# Patient Record
Sex: MALE | Race: WHITE | NOT HISPANIC OR LATINO | Employment: FULL TIME | URBAN - METROPOLITAN AREA
[De-identification: names, ages, dates, MRNs, and addresses within clinical notes are randomized per-mention and may not be internally consistent; named-entity substitution may affect disease eponyms.]

---

## 2024-01-23 ENCOUNTER — APPOINTMENT (OUTPATIENT)
Dept: CT IMAGING | Facility: CLINIC | Age: 44
End: 2024-01-23
Attending: EMERGENCY MEDICINE
Payer: COMMERCIAL

## 2024-01-23 ENCOUNTER — HOSPITAL ENCOUNTER (EMERGENCY)
Facility: CLINIC | Age: 44
Discharge: HOME | End: 2024-01-23
Admitting: EMERGENCY MEDICINE
Payer: COMMERCIAL

## 2024-01-23 VITALS
OXYGEN SATURATION: 90 % | WEIGHT: 225 LBS | RESPIRATION RATE: 25 BRPM | HEIGHT: 70 IN | HEART RATE: 86 BPM | BODY MASS INDEX: 32.21 KG/M2 | DIASTOLIC BLOOD PRESSURE: 70 MMHG | TEMPERATURE: 97.7 F | SYSTOLIC BLOOD PRESSURE: 128 MMHG

## 2024-01-23 DIAGNOSIS — F12.929: ICD-10-CM

## 2024-01-23 LAB
ALBUMIN SERPL BCG-MCNC: 4.4 G/DL (ref 3.5–5.2)
ALP SERPL-CCNC: 74 U/L (ref 40–150)
ALT SERPL W P-5'-P-CCNC: ABNORMAL U/L
AMPHETAMINES UR QL SCN: NORMAL
ANION GAP SERPL CALCULATED.3IONS-SCNC: 7 MMOL/L (ref 7–15)
AST SERPL W P-5'-P-CCNC: ABNORMAL U/L
ATRIAL RATE - MUSE: 94 BPM
BARBITURATES UR QL SCN: NORMAL
BASOPHILS # BLD AUTO: 0 10E3/UL (ref 0–0.2)
BASOPHILS NFR BLD AUTO: 0 %
BENZODIAZ UR QL SCN: NORMAL
BILIRUB SERPL-MCNC: 0.3 MG/DL
BUN SERPL-MCNC: 13.9 MG/DL (ref 6–20)
BZE UR QL SCN: NORMAL
CALCIUM SERPL-MCNC: 9.4 MG/DL (ref 8.6–10)
CANNABINOIDS UR QL SCN: NORMAL
CHLORIDE SERPL-SCNC: 102 MMOL/L (ref 98–107)
CREAT SERPL-MCNC: 1 MG/DL (ref 0.67–1.17)
DEPRECATED HCO3 PLAS-SCNC: 28 MMOL/L (ref 22–29)
DIASTOLIC BLOOD PRESSURE - MUSE: 73 MMHG
EGFRCR SERPLBLD CKD-EPI 2021: >90 ML/MIN/1.73M2
EOSINOPHIL # BLD AUTO: 0 10E3/UL (ref 0–0.7)
EOSINOPHIL NFR BLD AUTO: 0 %
ERYTHROCYTE [DISTWIDTH] IN BLOOD BY AUTOMATED COUNT: 12.5 % (ref 10–15)
ETHANOL SERPL-MCNC: <0.01 G/DL
FENTANYL UR QL: NORMAL
GLUCOSE SERPL-MCNC: 135 MG/DL (ref 70–99)
HCT VFR BLD AUTO: 45.8 % (ref 40–53)
HGB BLD-MCNC: 15.6 G/DL (ref 13.3–17.7)
IMM GRANULOCYTES # BLD: 0 10E3/UL
IMM GRANULOCYTES NFR BLD: 0 %
INTERPRETATION ECG - MUSE: NORMAL
LYMPHOCYTES # BLD AUTO: 0.7 10E3/UL (ref 0.8–5.3)
LYMPHOCYTES NFR BLD AUTO: 9 %
MAGNESIUM SERPL-MCNC: 2.2 MG/DL (ref 1.7–2.3)
MCH RBC QN AUTO: 30.6 PG (ref 26.5–33)
MCHC RBC AUTO-ENTMCNC: 34.1 G/DL (ref 31.5–36.5)
MCV RBC AUTO: 90 FL (ref 78–100)
MONOCYTES # BLD AUTO: 0.4 10E3/UL (ref 0–1.3)
MONOCYTES NFR BLD AUTO: 6 %
NEUTROPHILS # BLD AUTO: 6.5 10E3/UL (ref 1.6–8.3)
NEUTROPHILS NFR BLD AUTO: 85 %
NRBC # BLD AUTO: 0 10E3/UL
NRBC BLD AUTO-RTO: 0 /100
OPIATES UR QL SCN: NORMAL
P AXIS - MUSE: 49 DEGREES
PCP QUAL URINE (ROCHE): NORMAL
PLATELET # BLD AUTO: 266 10E3/UL (ref 150–450)
POTASSIUM SERPL-SCNC: 5.7 MMOL/L (ref 3.4–5.3)
PR INTERVAL - MUSE: 174 MS
PROT SERPL-MCNC: 7.4 G/DL (ref 6.4–8.3)
QRS DURATION - MUSE: 88 MS
QT - MUSE: 372 MS
QTC - MUSE: 465 MS
R AXIS - MUSE: -20 DEGREES
RBC # BLD AUTO: 5.09 10E6/UL (ref 4.4–5.9)
SODIUM SERPL-SCNC: 137 MMOL/L (ref 135–145)
SYSTOLIC BLOOD PRESSURE - MUSE: 116 MMHG
T AXIS - MUSE: 44 DEGREES
VENTRICULAR RATE- MUSE: 94 BPM
WBC # BLD AUTO: 7.7 10E3/UL (ref 4–11)

## 2024-01-23 PROCEDURE — 85025 COMPLETE CBC W/AUTO DIFF WBC: CPT | Performed by: EMERGENCY MEDICINE

## 2024-01-23 PROCEDURE — 82077 ASSAY SPEC XCP UR&BREATH IA: CPT | Performed by: EMERGENCY MEDICINE

## 2024-01-23 PROCEDURE — 258N000003 HC RX IP 258 OP 636: Performed by: EMERGENCY MEDICINE

## 2024-01-23 PROCEDURE — 93005 ELECTROCARDIOGRAM TRACING: CPT | Performed by: EMERGENCY MEDICINE

## 2024-01-23 PROCEDURE — 36415 COLL VENOUS BLD VENIPUNCTURE: CPT | Performed by: EMERGENCY MEDICINE

## 2024-01-23 PROCEDURE — 83735 ASSAY OF MAGNESIUM: CPT | Performed by: EMERGENCY MEDICINE

## 2024-01-23 PROCEDURE — 84075 ASSAY ALKALINE PHOSPHATASE: CPT | Performed by: EMERGENCY MEDICINE

## 2024-01-23 PROCEDURE — 96360 HYDRATION IV INFUSION INIT: CPT

## 2024-01-23 PROCEDURE — 70450 CT HEAD/BRAIN W/O DYE: CPT

## 2024-01-23 PROCEDURE — 80307 DRUG TEST PRSMV CHEM ANLYZR: CPT | Performed by: EMERGENCY MEDICINE

## 2024-01-23 PROCEDURE — 99285 EMERGENCY DEPT VISIT HI MDM: CPT | Mod: 25

## 2024-01-23 RX ADMIN — SODIUM CHLORIDE 1000 ML: 9 INJECTION, SOLUTION INTRAVENOUS at 12:57

## 2024-01-23 ASSESSMENT — ENCOUNTER SYMPTOMS
NAUSEA: 0
ABDOMINAL PAIN: 0
FATIGUE: 1
COLOR CHANGE: 0
WEAKNESS: 1
FEVER: 0
SHORTNESS OF BREATH: 0
VOMITING: 0
CONFUSION: 1
CHILLS: 0

## 2024-01-23 ASSESSMENT — ACTIVITIES OF DAILY LIVING (ADL)
ADLS_ACUITY_SCORE: 35
ADLS_ACUITY_SCORE: 35

## 2024-01-23 NOTE — ED PROVIDER NOTES
EMERGENCY DEPARTMENT ENCOUNTER      NAME: Ricki Murray  AGE: 43 year old male  YOB: 1980  MRN: [id number]  EVALUATION DATE & TIME: 1/23/2024 11:45 AM    PCP: No primary care provider on file.    ED PROVIDER: Dawn Posadas PA-C      Chief Complaint   Patient presents with    Altered Mental Status         FINAL IMPRESSION:  1. Marijuana intoxication (H24)          ED COURSE & MEDICAL DECISION MAKING:    Pertinent Labs & Imaging studies reviewed. (See chart for details)    43 year old male presents to the Emergency Department for evaluation of altered mental status.    Physical exam is remarkable for a drowsy but awake male, comfortably lying in bed during my initial evaluation.  Pupils are equal but dilated and sluggish. Heart and lung sounds are clear diffusely throughout.  Lips are noted to be dry.  Abdomen is soft and nontender.  No focal neurologic deficits, cranial nerves III through XII appear grossly intact.  Strength is 5 out of 5 in the upper and lower extremities bilaterally.  Patient is oriented to place, self, and day of the week.  Vital signs were stable, patient did have some lower 90s oxygen saturations when laying flat and sleeping but they were normal when he was awake; he is afebrile.    CBC is unremarkable with no leukocytosis or anemia.  CMP is unremarkable with no significant electrolyte derangements, normal liver and kidney function. Mild hyperkalemia noted at 5.7 but slightly hemolyzed so suspect false elevation; EKG without evidence of hyperkalemia. Magnesium within normal limits. Blood alcohol is negative. Drug screen negative. Head CT obtained with no abnormal findings.     The patient was given IV fluids and allowed to rest for a few hours.  I do not think any further emergent labs or imaging are indicated at this time.  He is overall well-appearing here and hemodynamically stable.  He does not have any focal neurologic deficits and his workup here is overall reassuring.   He does not appear to be undergoing alcohol withdrawal at this time, I do suspect his symptoms are secondary to marijuana intoxication.  Patient will be discharged home with his roommate who agrees to monitor him.  Advised return here for any new or worsening symptoms.  Patient and roommate are agreeable with this treatment plan and verbalized understanding.    Medical Decision Making    History:  Supplemental history from: Friend  External Record(s) reviewed: Documented in chart    Work Up:  Chart documentation includes differential considered and any EKGs or imaging independently interpreted by provider, where specified.  In additional to work up documented, I considered the following work up: Imaging MRI, but deferred due to no focal neurologic deficits and normal CT.    External consultation:  Discussion of management with another provider: Documented in chart, if applicable    Complicating factors:  Care impacted by chronic illness: N/A  Care affected by social determinants of health: Alcohol Abuse and/or Recreational Drug Use    Disposition considerations: Discharge. No recommendations on prescription strength medication(s). I considered admission, but discharged patient after significant clinical improvement.    ED Course   12:09 PM Performed my initial history and physical exam. Discussed workup in the emergency department, management of symptoms, and likely disposition.   1:39 PM Updated with test results. Patient still very somnolent.   2:49 PM I discussed the plan for discharge with the patient or family and they are agreeable.. We discussed supportive cares at home and reasons for return to the ER including new or worsening symptoms - all questions and concerns addressed. Patient to be discharged by RN.    At the conclusion of the encounter I discussed the results of all of the tests and the disposition. The questions were answered. The patient or family acknowledged understanding and was agreeable with  the care plan.     Voice recognition software was used in the creation of this note. Any grammatical or nonsensical errors are due to inherent errors with the software and are not the intention of the writer.     MEDICATIONS GIVEN IN THE EMERGENCY:  Medications   sodium chloride 0.9% BOLUS 1,000 mL (0 mLs Intravenous Stopped 1/23/24 8445)       NEW PRESCRIPTIONS STARTED AT TODAY'S ER VISIT  There are no discharge medications for this patient.           =================================================================    HPI    Patient information was obtained from: Patient    Use of : N/A      Ricki Murray is a 43 year old male who presents to the ED via EMS with roommate for evaluation of altered mental status.    The patient reports that last night around 10:00 PM, he took 15 mg of a THC gummy.  He took the remainder of the gummy about an hour later.  This morning, he developed drowsiness and felt that his lower legs became weak and he was having difficulty moving them.  He notes that he takes THC Gummies occasionally and has never had any effects like this before.  He denies any other drug use.  He is a regular alcohol drinker and notes that he typically drinks daily but has not had any alcohol in the last 24 hours.  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 He takes Losartan daily for high cholesterol but otherwise denies any medical problems.     He denies any chest pain, shortness of breath, nausea, vomiting, abdominal pain, or fevers.      REVIEW OF SYSTEMS   Review of Systems   Constitutional:  Positive for fatigue. Negative for chills and fever.   Respiratory:  Negative for shortness of breath.    Cardiovascular:  Negative for chest pain.   Gastrointestinal:  Negative for abdominal pain, nausea and vomiting.   Skin:  Negative for color change.   Neurological:  Positive for weakness.   Psychiatric/Behavioral:   "Positive for confusion.        All other systems reviewed and are negative unless noted in HPI.      PAST MEDICAL HISTORY:  No past medical history on file.    PAST SURGICAL HISTORY:  No past surgical history on file.    CURRENT MEDICATIONS:    No current outpatient medications on file.      ALLERGIES:  No Known Allergies    FAMILY HISTORY:  No family history on file.    SOCIAL HISTORY:        VITALS:  Patient Vitals for the past 24 hrs:   BP Temp Pulse Resp SpO2 Height Weight   01/23/24 1503 128/70 -- 86 25 -- -- --   01/23/24 1445 (!) 141/78 -- 86 23 -- -- --   01/23/24 1432 135/74 -- 85 21 90 % -- --   01/23/24 1417 117/71 -- 79 16 99 % -- --   01/23/24 1402 118/71 -- 79 14 99 % -- --   01/23/24 1347 124/73 -- 81 19 100 % -- --   01/23/24 1335 123/71 -- 80 14 100 % -- --   01/23/24 1309 116/70 -- 84 18 100 % -- --   01/23/24 1254 117/69 -- 90 16 97 % -- --   01/23/24 1245 121/74 -- 94 19 98 % -- --   01/23/24 1230 109/60 -- 90 19 96 % -- --   01/23/24 1215 131/74 -- 98 25 97 % -- --   01/23/24 1200 107/60 -- 93 19 92 % -- --   01/23/24 1152 -- -- -- -- -- 1.778 m (5' 10\") 102.1 kg (225 lb)   01/23/24 1146 119/74 97.7  F (36.5  C) 94 16 99 % -- --       PHYSICAL EXAM    VITAL SIGNS: /70   Pulse 86   Temp 97.7  F (36.5  C)   Resp 25   Ht 1.778 m (5' 10\")   Wt 102.1 kg (225 lb)   SpO2 90%   BMI 32.28 kg/m    General Appearance: Drowsy; Alert, cooperative, normal speech and facial symmetry, appears stated age  Head:  Normocephalic, without obvious abnormality, atraumatic  Eyes:  Pupils are equal but dilated and sluggish; Conjunctiva/corneas clear, EOM's intact, no nystagmus  ENT: Dry lips; mucosa and tongue normal; teeth and gums normal, no pharyngeal inflammation, no dysphonia or difficulty swallowing, membranes are moist without pallor  Cardio:  Regular rate and rhythm, S1 and S2 normal, no murmur, rub    or gallop, 2+ pulses symmetric in all extremities  Pulm:  Clear to auscultation bilaterally, " respirations unlabored with no accessory muscle use  Abdomen:  Abdomen is soft, non-distended with no tenderness to palpation, rebound tenderness, or guarding.   Extremities:  Extremities normal, there is no tenderness to palpation, atraumatic, no cyanosis or edema, full function and range of motion, pulses equal in all extremities, normal cap refill, no joint swelling; strength is 5/5 in the upper and lower extremities bilaterally  Skin: Skin is warm and dry, no rashes or wounds  Neuro: Patient is awake, alert, and responsive to voice. No gross motor weaknesses or sensory loss; moves all extremities. Cranial Nerves:  CN2: No funduscopic exam performed. CN3,4 & 6: Pupillary light response, lateral and vertical gaze normal.  No nystagmus.  CN7: No facial weakness, smile, facial symmetry intact. CN8: Intact to spoken voice. CN9&10: Gag reflex, uvula midline, palate rises with phonation. CN11: Shoulder shrug 5/5 intact bilaterally. CN12: Tongue midline and moves freely from side to side.      LAB:  All pertinent labs reviewed and interpreted.  Labs Ordered and Resulted from Time of ED Arrival to Time of ED Departure   COMPREHENSIVE METABOLIC PANEL - Abnormal       Result Value    Sodium 137      Potassium 5.7 (*)     Carbon Dioxide (CO2) 28      Anion Gap 7      Urea Nitrogen 13.9      Creatinine 1.00      GFR Estimate >90      Calcium 9.4      Chloride 102      Glucose 135 (*)     Alkaline Phosphatase 74      AST        ALT        Protein Total 7.4      Albumin 4.4      Bilirubin Total 0.3     CBC WITH PLATELETS AND DIFFERENTIAL - Abnormal    WBC Count 7.7      RBC Count 5.09      Hemoglobin 15.6      Hematocrit 45.8      MCV 90      MCH 30.6      MCHC 34.1      RDW 12.5      Platelet Count 266      % Neutrophils 85      % Lymphocytes 9      % Monocytes 6      % Eosinophils 0      % Basophils 0      % Immature Granulocytes 0      NRBCs per 100 WBC 0      Absolute Neutrophils 6.5      Absolute Lymphocytes 0.7 (*)      Absolute Monocytes 0.4      Absolute Eosinophils 0.0      Absolute Basophils 0.0      Absolute Immature Granulocytes 0.0      Absolute NRBCs 0.0     MAGNESIUM - Normal    Magnesium 2.2     ETHYL ALCOHOL LEVEL - Normal    Alcohol ethyl <0.01     URINE DRUG SCREEN PANEL - Normal    Amphetamines Urine Screen Negative      Barbituates Urine Screen Negative      Benzodiazepine Urine Screen Negative      Cannabinoids Urine Screen Negative      Cocaine Urine Screen Negative      Fentanyl Qual Urine Screen Negative      Opiates Urine Screen Negative      PCP Urine Screen Negative         RADIOLOGY:  Reviewed all pertinent imaging. Please see official radiology report.  Head CT w/o contrast   Final Result   IMPRESSION:   1.  No evidence of an acute intracranial abnormality.             EKG:    Performed at: 11:50 AM    I have independently reviewed and interpreted the EKG, along with the final read. EKG also reviewed by Dr. Kelly Villareal.    Ventricular rate 94 bpm  ID interval 174 ms  QRS duration 88 ms  QT/QTc 372/465 ms  P-R-T axes 49 -20 44    Impression: NSR            Dawn Posadas PA-C  Emergency Medicine  [facility] EMERGENCY ROOM  [facility] [facility]] [id number] [phone]  Dept: [phone]         Dawn Posadas PA-C  01/23/24 7796

## 2024-01-23 NOTE — ED NOTES
Discharge paperwork discussed with pt. Questions were answered to patient satisfaction. Clara Blanca RN 1/23/2024 3:04 PM

## 2024-01-23 NOTE — ED NOTES
Patient placed on oxygen via nasal cannula at 3L/minute due to drowsiness and low oxygen saturation when sleeping.

## 2024-01-23 NOTE — Clinical Note
Ricki Murray was seen and treated in our emergency department on 1/23/2024.  He may return to work on 01/25/2024.       If you have any questions or concerns, please don't hesitate to call.      Dawn Posadas PA-C

## 2024-01-23 NOTE — ED TRIAGE NOTES
Pt brought in by EMS because he was worried he was having a heart attack and couldn't walk or stand up. Took 1/2 of a THC gummy last night at 2100 and the other half at 0100 this am and they this am just couldn't get up or care for self. Pupils very dilated. Pt slow to respond to questions. Denies taking anything else or being suicidal.

## 2024-01-23 NOTE — DISCHARGE INSTRUCTIONS
You were seen here today for evaluation of leg weakness.  Your workup today is reassuring with no evidence of infection, electrolyte problems, bleeding in your brain, or stroke.    I believe your symptoms are due to marijuana intoxication.  Continue to rest and make sure you are drinking plenty of fluids.    Follow-up with your primary care provider for recheck if needed.  Return here for any new or worsening symptoms including severe pain, confusion, vomiting, chest pain, difficulty breathing, new weakness, or any other symptoms that concern you.